# Patient Record
Sex: MALE | Race: WHITE | NOT HISPANIC OR LATINO | Employment: FULL TIME | URBAN - METROPOLITAN AREA
[De-identification: names, ages, dates, MRNs, and addresses within clinical notes are randomized per-mention and may not be internally consistent; named-entity substitution may affect disease eponyms.]

---

## 2023-04-24 PROBLEM — G90.2 HORNER'S SYNDROME: Status: ACTIVE | Noted: 2023-04-24

## 2023-04-24 PROBLEM — E78.5 HYPERLIPIDEMIA: Status: ACTIVE | Noted: 2023-04-24

## 2023-04-24 PROBLEM — R17 ELEVATED BILIRUBIN: Status: ACTIVE | Noted: 2023-04-24

## 2023-04-25 ENCOUNTER — OFFICE VISIT (OUTPATIENT)
Dept: PRIMARY CARE | Facility: CLINIC | Age: 53
End: 2023-04-25
Payer: COMMERCIAL

## 2023-04-25 VITALS
DIASTOLIC BLOOD PRESSURE: 78 MMHG | HEIGHT: 71 IN | BODY MASS INDEX: 28.9 KG/M2 | HEART RATE: 76 BPM | SYSTOLIC BLOOD PRESSURE: 136 MMHG | WEIGHT: 206.4 LBS

## 2023-04-25 DIAGNOSIS — E78.2 MIXED HYPERLIPIDEMIA: Primary | ICD-10-CM

## 2023-04-25 PROCEDURE — 99213 OFFICE O/P EST LOW 20 MIN: CPT | Performed by: STUDENT IN AN ORGANIZED HEALTH CARE EDUCATION/TRAINING PROGRAM

## 2023-04-25 PROCEDURE — 1036F TOBACCO NON-USER: CPT | Performed by: STUDENT IN AN ORGANIZED HEALTH CARE EDUCATION/TRAINING PROGRAM

## 2023-04-25 NOTE — PROGRESS NOTES
"Follow up on cholesterol.    Subjective   Patient ID: Minh Lazaro is a 52 y.o. male who presents for Hyperlipidemia.    HPI  Regarding HLD, has been working on exercise. Riding peloton 2-3x/wk, 20-30min rides each time. Has not yet started working on dietary modifications. Did have another FLP done about 1mo ago for life insurance policy and states total cholesterol was >270. Does have fhx CVA and DM2 in father and paternal uncles. Patient admits to eating red meat most days. Also eats quite a bit of fried foods. Eating fast food about 5d/wk at this time. He feels well and denies CP, SOB, palpitations, HA, LH, abd pain, n/v/d.     Review of Systems   Constitutional:  Negative for chills and fever.   Respiratory:  Negative for cough and shortness of breath.    Cardiovascular:  Negative for chest pain and palpitations.   All other systems reviewed and are negative.    Objective   /78   Pulse 76   Ht 1.803 m (5' 11\")   Wt 93.6 kg (206 lb 6.4 oz)   BMI 28.79 kg/m²     Physical Exam  Constitutional:       Appearance: Normal appearance.   HENT:      Head: Normocephalic and atraumatic.   Cardiovascular:      Rate and Rhythm: Normal rate.   Pulmonary:      Effort: Pulmonary effort is normal. No respiratory distress.   Neurological:      Mental Status: He is alert.   Psychiatric:         Mood and Affect: Mood normal.         Behavior: Behavior normal.       Assessment/Plan   Problem List Items Addressed This Visit       Hyperlipidemia - Primary     FLP and ASCVD risk reviewed. Using shared decision making, we decided to trial lifestyle modifications x6mo and then recheck. Dietary and exercise goals reviewed. Follow up in 6mo for recheck, sooner if needed. Labs prior to appt.         Relevant Orders    Lipid Panel    Comprehensive Metabolic Panel    Follow Up In Primary Care        "

## 2023-04-26 ASSESSMENT — ENCOUNTER SYMPTOMS
COUGH: 0
FEVER: 0
CHILLS: 0
SHORTNESS OF BREATH: 0
PALPITATIONS: 0

## 2023-04-26 NOTE — ASSESSMENT & PLAN NOTE
FLP and ASCVD risk reviewed. Using shared decision making, we decided to trial lifestyle modifications x6mo and then recheck. Dietary and exercise goals reviewed. Follow up in 6mo for recheck, sooner if needed. Labs prior to appt.

## 2023-05-01 ENCOUNTER — APPOINTMENT (OUTPATIENT)
Dept: PRIMARY CARE | Facility: CLINIC | Age: 53
End: 2023-05-01
Payer: COMMERCIAL